# Patient Record
Sex: FEMALE | Race: WHITE | NOT HISPANIC OR LATINO
[De-identification: names, ages, dates, MRNs, and addresses within clinical notes are randomized per-mention and may not be internally consistent; named-entity substitution may affect disease eponyms.]

---

## 2018-11-19 ENCOUNTER — RESULT REVIEW (OUTPATIENT)
Age: 28
End: 2018-11-19

## 2022-04-18 ENCOUNTER — APPOINTMENT (OUTPATIENT)
Dept: PULMONOLOGY | Facility: CLINIC | Age: 32
End: 2022-04-18
Payer: MEDICAID

## 2022-04-18 ENCOUNTER — NON-APPOINTMENT (OUTPATIENT)
Age: 32
End: 2022-04-18

## 2022-04-18 VITALS — WEIGHT: 170.8 LBS

## 2022-04-18 VITALS — SYSTOLIC BLOOD PRESSURE: 120 MMHG | DIASTOLIC BLOOD PRESSURE: 78 MMHG

## 2022-04-18 DIAGNOSIS — Z87.898 PERSONAL HISTORY OF OTHER SPECIFIED CONDITIONS: ICD-10-CM

## 2022-04-18 PROBLEM — Z00.00 ENCOUNTER FOR PREVENTIVE HEALTH EXAMINATION: Noted: 2022-04-18

## 2022-04-18 PROCEDURE — 99215 OFFICE O/P EST HI 40 MIN: CPT

## 2022-04-18 RX ORDER — BUDESONIDE AND FORMOTEROL FUMARATE DIHYDRATE 160; 4.5 UG/1; UG/1
160-4.5 AEROSOL RESPIRATORY (INHALATION)
Refills: 0 | Status: DISCONTINUED | COMMUNITY
Start: 2022-04-15 | End: 2022-04-18

## 2022-04-18 RX ORDER — METHYLPREDNISOLONE 4 MG/1
4 TABLET ORAL
Qty: 21 | Refills: 0 | Status: DISCONTINUED | COMMUNITY
Start: 2022-04-13 | End: 2022-04-18

## 2022-04-18 RX ORDER — ALBUTEROL SULFATE 90 UG/1
108 (90 BASE) INHALANT RESPIRATORY (INHALATION)
Qty: 7 | Refills: 0 | Status: DISCONTINUED | COMMUNITY
Start: 2022-04-13 | End: 2022-04-18

## 2022-04-18 RX ORDER — MINOCYCLINE HYDROCHLORIDE 100 MG/1
100 CAPSULE ORAL
Qty: 30 | Refills: 0 | Status: DISCONTINUED | COMMUNITY
Start: 2021-10-14 | End: 2022-04-18

## 2022-04-18 RX ORDER — ISOTRETINOIN 30 MG/1
30 CAPSULE, LIQUID FILLED ORAL
Qty: 30 | Refills: 0 | Status: DISCONTINUED | COMMUNITY
Start: 2022-02-07 | End: 2022-04-18

## 2022-04-18 RX ORDER — FLUCONAZOLE 150 MG/1
150 TABLET ORAL
Qty: 4 | Refills: 0 | Status: DISCONTINUED | COMMUNITY
Start: 2021-10-14 | End: 2022-04-18

## 2022-04-18 RX ORDER — ISOTRETINOIN 40 MG/1
40 CAPSULE, LIQUID FILLED ORAL
Qty: 30 | Refills: 0 | Status: DISCONTINUED | COMMUNITY
Start: 2022-03-04 | End: 2022-04-18

## 2022-04-18 NOTE — REVIEW OF SYSTEMS
[Fatigue] : fatigue [Recent Wt Gain (___ Lbs)] : ~T recent [unfilled] lb weight gain [Poor Appetite] : poor appetite [Epistaxis] : epistaxis [Dyspnea] : dyspnea [SOB on Exertion] : sob on exertion [GERD] : gerd [Anxiety] : anxiety [Obesity] : obesity [Fever] : no fever [Chills] : no chills [Cough] : no cough [Hemoptysis] : no hemoptysis [Sputum] : no sputum [Wheezing] : no wheezing [Abdominal Pain] : no abdominal pain [Nausea] : no nausea [Vomiting] : no vomiting [Constipation] : no constipation [Dysphagia] : no dysphagia [Bleeding] : no bleeding [Myalgias] : no myalgias [Back Pain] : no back pain [Chronic Pain] : no chronic pain [Rash] : no rash [Blood Transfusion] : no blood transfusion [Clotting Disorder/ Frequent bleeding] : no clotting disorder/ frequent bleeding [Depression] : no depression [Panic Attacks] : no panic attacks [Diabetes] : no diabetes [Thyroid Problem] : no thyroid problem [TextBox_69] : improved with nexium

## 2022-04-18 NOTE — PHYSICAL EXAM
[Normal Oropharynx] : normal oropharynx [II] : Mallampati Class: II [Supple] : supple [No Neck Mass] : no neck mass [No JVD] : no jvd [Clear to Auscultation Bilaterally] : clear to auscultation bilaterally [No Masses] : no masses [Soft] : soft [No Hernias] : no hernias [Normal Bowel Sounds] : normal bowel sounds [Normal Gait] : normal gait [No Clubbing] : no clubbing [No Edema] : no edema [No Rash] : no rash [No Focal Deficits] : no focal deficits [No Motor Deficits] : no motor deficits [TextBox_2] : pleasant f no distress speaking full sentences    no cough noted  [TextBox_54] : s1 s2 tachycardia

## 2022-04-19 ENCOUNTER — RX CHANGE (OUTPATIENT)
Age: 32
End: 2022-04-19

## 2022-04-19 RX ORDER — FLUTICASONE PROPIONATE 220 UG/1
220 AEROSOL, METERED RESPIRATORY (INHALATION)
Qty: 1 | Refills: 3 | Status: DISCONTINUED | COMMUNITY
Start: 2022-04-18 | End: 2022-04-19

## 2022-06-13 ENCOUNTER — APPOINTMENT (OUTPATIENT)
Dept: PULMONOLOGY | Facility: CLINIC | Age: 32
End: 2022-06-13

## 2022-06-13 ENCOUNTER — APPOINTMENT (OUTPATIENT)
Dept: PULMONOLOGY | Facility: CLINIC | Age: 32
End: 2022-06-13
Payer: MEDICAID

## 2022-06-13 VITALS
HEIGHT: 64.17 IN | DIASTOLIC BLOOD PRESSURE: 78 MMHG | BODY MASS INDEX: 30.22 KG/M2 | HEART RATE: 88 BPM | OXYGEN SATURATION: 99 % | SYSTOLIC BLOOD PRESSURE: 118 MMHG | TEMPERATURE: 98.2 F | WEIGHT: 177 LBS

## 2022-06-13 PROCEDURE — 95070 INHLJ BRNCL CHALLENGE TSTG: CPT

## 2022-06-13 PROCEDURE — 94070 EVALUATION OF WHEEZING: CPT

## 2022-06-14 ENCOUNTER — APPOINTMENT (OUTPATIENT)
Dept: PULMONOLOGY | Facility: CLINIC | Age: 32
End: 2022-06-14
Payer: MEDICAID

## 2022-06-14 VITALS — HEIGHT: 64.5 IN | WEIGHT: 175 LBS | BODY MASS INDEX: 29.51 KG/M2

## 2022-06-14 VITALS
DIASTOLIC BLOOD PRESSURE: 70 MMHG | OXYGEN SATURATION: 99 % | RESPIRATION RATE: 16 BRPM | SYSTOLIC BLOOD PRESSURE: 114 MMHG | HEART RATE: 94 BPM

## 2022-06-14 PROCEDURE — 94729 DIFFUSING CAPACITY: CPT

## 2022-06-14 PROCEDURE — 94727 GAS DIL/WSHOT DETER LNG VOL: CPT

## 2022-06-14 PROCEDURE — 99213 OFFICE O/P EST LOW 20 MIN: CPT | Mod: 25

## 2022-06-14 PROCEDURE — 94010 BREATHING CAPACITY TEST: CPT

## 2022-06-14 PROCEDURE — 85018 HEMOGLOBIN: CPT | Mod: QW

## 2022-06-14 RX ORDER — ESOMEPRAZOLE MAGNESIUM 40 MG/1
CAPSULE, DELAYED RELEASE ORAL
Refills: 0 | Status: DISCONTINUED | COMMUNITY
End: 2022-06-14

## 2022-06-14 RX ORDER — ALBUTEROL SULFATE 90 UG/1
108 (90 BASE) INHALANT RESPIRATORY (INHALATION)
Qty: 1 | Refills: 5 | Status: DISCONTINUED | COMMUNITY
Start: 2022-04-19 | End: 2022-06-14

## 2022-06-14 RX ORDER — MONTELUKAST 10 MG/1
10 TABLET, FILM COATED ORAL
Qty: 30 | Refills: 0 | Status: DISCONTINUED | COMMUNITY
Start: 2022-04-13 | End: 2022-06-14

## 2022-06-14 NOTE — ASSESSMENT
[FreeTextEntry1] : 31y/o female with \par \par 1-   shortness of breath    ( last episode  10 years ago)\par            ddx   tachycardia  vs  ozempic  vs       laryngospasm  vs panic attacks\par 2-  h/o   tachycardia  last work up  cardiology  2018\par 3-  weight loss with   ozempic\par 4-  gerd\par 5-  has two cats \par 6- anxiety   now on xanax \par \par \par Recommendations\par 1-   cardiopulmonary stress test ( tachycardia response to exercise)\par 2-  cardiology follow up \par 3-  trial to stop   ozempic\par 4-  PPI  ENT upper airway inspection \par 5-   d/c flovent 220 bid  d/c   xopenex  two inh  q 4 hour prn  sob \par \par patient agrees to above \par return in  four weeks

## 2022-06-14 NOTE — PHYSICAL EXAM
[No Acute Distress] : no acute distress [III] : Mallampati Class: III [Normal Appearance] : normal appearance [Supple] : supple [No Neck Mass] : no neck mass [No JVD] : no jvd [Normal S1, S2] : normal s1, s2 [Clear to Auscultation Bilaterally] : clear to auscultation bilaterally [No Masses] : no masses [Soft] : soft [No Hernias] : no hernias [Normal Bowel Sounds] : normal bowel sounds [Normal Gait] : normal gait [No Clubbing] : no clubbing [No Edema] : no edema [No Rash] : no rash [No Focal Deficits] : no focal deficits [Normal Affect] : normal affect [TextBox_2] : pleasant f no distress speaking full sentences  no cough  [TextBox_11] : nc/at moist

## 2022-06-17 ENCOUNTER — APPOINTMENT (OUTPATIENT)
Dept: OTOLARYNGOLOGY | Facility: CLINIC | Age: 32
End: 2022-06-17
Payer: MEDICAID

## 2022-06-17 VITALS
SYSTOLIC BLOOD PRESSURE: 123 MMHG | WEIGHT: 175 LBS | HEART RATE: 83 BPM | DIASTOLIC BLOOD PRESSURE: 84 MMHG | BODY MASS INDEX: 29.51 KG/M2 | HEIGHT: 64.5 IN

## 2022-06-17 DIAGNOSIS — K14.8 OTHER DISEASES OF TONGUE: ICD-10-CM

## 2022-06-17 DIAGNOSIS — H93.13 TINNITUS, BILATERAL: ICD-10-CM

## 2022-06-17 DIAGNOSIS — Z80.9 FAMILY HISTORY OF MALIGNANT NEOPLASM, UNSPECIFIED: ICD-10-CM

## 2022-06-17 DIAGNOSIS — Z83.3 FAMILY HISTORY OF DIABETES MELLITUS: ICD-10-CM

## 2022-06-17 DIAGNOSIS — Z84.89 FAMILY HISTORY OF OTHER SPECIFIED CONDITIONS: ICD-10-CM

## 2022-06-17 DIAGNOSIS — G43.909 MIGRAINE, UNSPECIFIED, NOT INTRACTABLE, W/OUT STATUS MIGRAINOSUS: ICD-10-CM

## 2022-06-17 DIAGNOSIS — Z82.2 FAMILY HISTORY OF DEAFNESS AND HEARING LOSS: ICD-10-CM

## 2022-06-17 DIAGNOSIS — Z78.9 OTHER SPECIFIED HEALTH STATUS: ICD-10-CM

## 2022-06-17 PROCEDURE — 99203 OFFICE O/P NEW LOW 30 MIN: CPT | Mod: 25

## 2022-06-17 PROCEDURE — 31575 DIAGNOSTIC LARYNGOSCOPY: CPT

## 2022-06-17 PROCEDURE — 92557 COMPREHENSIVE HEARING TEST: CPT

## 2022-06-17 PROCEDURE — 92550 TYMPANOMETRY & REFLEX THRESH: CPT

## 2022-06-17 NOTE — HISTORY OF PRESENT ILLNESS
[de-identified] : Britta Garcia is a 31 yo female referred by Dr. Cuellar for evaluation of shortness of breath. Britta notes that the same symptoms occurred ten years ago. She had increased shortness of breath with no inciting factor and inability to breathe. She denies preceding trauma or URI symptoms. She went to the ED and was told that she may have asthma. She was treated for asthma but had no improvement in symptoms. The shortness of breath resolved after a few months. She notes that in Apr of this year, she woke up one morning and had the same sensation. She notes that she could not inhale well but notes she can exhale well. She notes that she measured her O2 saturatoin and this was normal. She was then seen by  PCP and pulmonologist. She was given oral steroids and singulair. She had no improvement. Currently, she feels throat tightness and mild shortness of breath, although less severe than before. She denies noisy breathing. She notes intermittent hoarseness, but denies dysphagia, odynophagia. She has had issues with GERD before, but not currently. She denies current heartburn. She denies aspiration episodes. She denies recent fevers, chills, unintentional weight loss. She notes a small lymph node in the left posterior neck. She notes chronic postnasal drainage. She denies rhinorrhea, nasal congestion, sinus pressure. She denies recurrent sinus infections. She notes that she had allergy testing previously which was negative, but feels she may have an antihistamine around the time of her test. She denies sneezing, pruritus, epiphora.\par \par She notes that she is near constant bilateral nonpulsatile tinnitus. She feels this has been present for 10 years. She denies hearing change, otalgia, otorrhea, vertigo. She denies facial weakness or facial numbness. She denies recurrent ear infections.\par \par She notes a dorsal tongue discolored spot that has been present her entire life. She denies pain related to it or any change in it.\par \par Previous CXR was normal. PFT revealed ?upper airway flattening. She has undergone methacholine challenge and this was negative. She notes that she has a cardiac stress test scheduled.

## 2022-06-17 NOTE — CONSULT LETTER
[Dear  ___] : Dear  [unfilled], [Consult Letter:] : I had the pleasure of evaluating your patient, [unfilled]. [Please see my note below.] : Please see my note below. [Consult Closing:] : Thank you very much for allowing me to participate in the care of this patient.  If you have any questions, please do not hesitate to contact me. [Sincerely,] : Sincerely, [FreeTextEntry3] : Issac York M.D.

## 2022-06-17 NOTE — ASSESSMENT
[FreeTextEntry1] : Britta Camejo presents for evaluation of shortness of breath. She has undergone extensive workup which has been negative thus far. She denies any stridor. Flexible laryngoscopy was normal and a good but limited view of the subglottis was obtained, showing no evidence of subglottic stenosis. Discussed that this is a limited view and to evaluate the subglottis further, we could obtain CT neck. However, her symptoms and lack of stridor do not correlate with subglottic stenosis. Suspect that this is a lower airway versus cardiac issue and she will continue t to follow with her pulmonologist. \par \par In addition, she notes bilateral tinnitus. Audiogram was normal. Advised use of masking noise.\par \par Finally, she has a small discolored region of the dorsal tip of her tongue. She notes this has been present her whole life. This is likely a small vascular malformation. Advised her to keep an eye on it, and call me if it changes in size or causes pain.\par \par Follow up as needed.

## 2022-06-17 NOTE — DATA REVIEWED
[de-identified] : Tymps: Type A AU\par Audio: -8000 Hz AU\par Recs: 1. ENT f/u, 2. Re-eval as per MD

## 2022-06-17 NOTE — PHYSICAL EXAM
[Midline] : trachea located in midline position [Laryngoscopy Performed] : laryngoscopy was performed, see procedure section for findings [Normal] : no rashes [de-identified] : Small discolored region of right tip of tongue, soft, no palpable mass. Appears to be a vascular malformation.

## 2022-06-17 NOTE — REVIEW OF SYSTEMS
[Seasonal Allergies] : seasonal allergies [Post Nasal Drip] : post nasal drip [Ear Pain] : ear pain [Ear Itch] : ear itch [Throat Clearing] : throat clearing [Eyes Itch] : itching of the eyes [Wheezing] : wheezing [Anxiety] : anxiety [Negative] : Heme/Lymph

## 2022-06-30 ENCOUNTER — APPOINTMENT (OUTPATIENT)
Dept: PULMONOLOGY | Facility: CLINIC | Age: 32
End: 2022-06-30

## 2022-06-30 DIAGNOSIS — Z00.00 ENCOUNTER FOR GENERAL ADULT MEDICAL EXAMINATION W/OUT ABNORMAL FINDINGS: ICD-10-CM

## 2022-06-30 PROCEDURE — 94375 RESPIRATORY FLOW VOLUME LOOP: CPT

## 2022-06-30 PROCEDURE — 94621 CARDIOPULM EXERCISE TESTING: CPT

## 2022-07-08 ENCOUNTER — RX CHANGE (OUTPATIENT)
Age: 32
End: 2022-07-08

## 2022-07-08 ENCOUNTER — APPOINTMENT (OUTPATIENT)
Dept: PULMONOLOGY | Facility: CLINIC | Age: 32
End: 2022-07-08

## 2022-07-08 VITALS
SYSTOLIC BLOOD PRESSURE: 124 MMHG | DIASTOLIC BLOOD PRESSURE: 78 MMHG | OXYGEN SATURATION: 99 % | HEART RATE: 103 BPM | BODY MASS INDEX: 29.51 KG/M2 | WEIGHT: 175 LBS | HEIGHT: 64.5 IN | RESPIRATION RATE: 16 BRPM

## 2022-07-08 DIAGNOSIS — J45.909 UNSPECIFIED ASTHMA, UNCOMPLICATED: ICD-10-CM

## 2022-07-08 PROCEDURE — 99215 OFFICE O/P EST HI 40 MIN: CPT

## 2022-07-08 RX ORDER — LEVALBUTEROL TARTRATE 45 UG/1
45 AEROSOL, METERED ORAL
Qty: 1 | Refills: 3 | Status: DISCONTINUED | COMMUNITY
Start: 2022-04-18 | End: 2022-07-08

## 2022-07-08 RX ORDER — SEMAGLUTIDE 1.34 MG/ML
2 INJECTION, SOLUTION SUBCUTANEOUS
Qty: 2 | Refills: 0 | Status: DISCONTINUED | COMMUNITY
Start: 2022-03-31 | End: 2022-07-08

## 2022-07-08 NOTE — PHYSICAL EXAM
[III] : Mallampati Class: III [Supple] : supple [No Neck Mass] : no neck mass [No JVD] : no jvd [Clear to Auscultation Bilaterally] : clear to auscultation bilaterally [Not Tender] : not tender [No HSM] : no hsm [No Hernias] : no hernias [Normal Gait] : normal gait [No Clubbing] : no clubbing [No Edema] : no edema [No Rash] : no rash [No Motor Deficits] : no motor deficits [Normal Affect] : normal affect [TextBox_2] : well built female n odistress speaking full sentences no cough   [TextBox_54] : tachycardia noted no murmur

## 2022-07-12 ENCOUNTER — RESULT CHARGE (OUTPATIENT)
Age: 32
End: 2022-07-12

## 2022-07-21 ENCOUNTER — APPOINTMENT (OUTPATIENT)
Dept: CARDIOLOGY | Facility: CLINIC | Age: 32
End: 2022-07-21

## 2022-07-21 ENCOUNTER — NON-APPOINTMENT (OUTPATIENT)
Age: 32
End: 2022-07-21

## 2022-07-21 VITALS
HEART RATE: 97 BPM | DIASTOLIC BLOOD PRESSURE: 70 MMHG | SYSTOLIC BLOOD PRESSURE: 114 MMHG | TEMPERATURE: 98.1 F | OXYGEN SATURATION: 95 % | WEIGHT: 174 LBS | HEIGHT: 65 IN | BODY MASS INDEX: 28.99 KG/M2

## 2022-07-21 DIAGNOSIS — Z87.09 PERSONAL HISTORY OF OTHER DISEASES OF THE RESPIRATORY SYSTEM: ICD-10-CM

## 2022-07-21 DIAGNOSIS — R51.9 HEADACHE, UNSPECIFIED: ICD-10-CM

## 2022-07-21 DIAGNOSIS — R07.9 CHEST PAIN, UNSPECIFIED: ICD-10-CM

## 2022-07-21 DIAGNOSIS — R94.31 ABNORMAL ELECTROCARDIOGRAM [ECG] [EKG]: ICD-10-CM

## 2022-07-21 PROCEDURE — 93000 ELECTROCARDIOGRAM COMPLETE: CPT

## 2022-07-21 PROCEDURE — 99204 OFFICE O/P NEW MOD 45 MIN: CPT | Mod: 25

## 2022-07-21 RX ORDER — CETIRIZINE HYDROCHLORIDE 10 MG/1
10 CAPSULE, LIQUID FILLED ORAL
Refills: 0 | Status: DISCONTINUED | COMMUNITY
End: 2022-07-21

## 2022-07-21 RX ORDER — ERGOCALCIFEROL 1.25 MG/1
1.25 MG CAPSULE, LIQUID FILLED ORAL
Qty: 4 | Refills: 0 | Status: DISCONTINUED | COMMUNITY
Start: 2021-12-02 | End: 2022-07-21

## 2022-07-21 RX ORDER — CALCIUM CARBONATE 500 MG/1
TABLET, CHEWABLE ORAL
Refills: 0 | Status: DISCONTINUED | COMMUNITY
End: 2022-07-21

## 2022-07-21 RX ORDER — CLONAZEPAM 0.5 MG/1
0.5 TABLET ORAL
Qty: 90 | Refills: 0 | Status: DISCONTINUED | COMMUNITY
Start: 2021-10-17 | End: 2022-07-21

## 2022-07-21 NOTE — HISTORY OF PRESENT ILLNESS
[FreeTextEntry1] : 32F h/o anxiety and asthma following with pulmonary for dyspnea, CXR and CT chest negative, off Ozempic, sinus tachycardia had prior outside cardiology evaluation 2018, recent outside EKG with right axis and incomplete RBBB, outside Echo with LVEF 74% (GLS -19%), normal RV size/function, small pericardial effusion, refer to establish care with new cardiologist. \par \par \par Patient presents with her mother for the visit. \par Reports around age 21 had episode of shortness of breath seen by pulmonary then, told with inappropriate sinus tachycardia previously seen cardiologist ([facility]) intolerant to metoprolol switched to Cardizem but then with low blood pressure since discontinued meds. Since April been having recurrent shortness of breath required steroids use, reports had cardiopulmonary exercise 6/2022 with 12 minutes of exercise with peak HR of 173. Restarted Ozempic with recurrence of shortness of breath and chest discomfort last week, self restarted prednisone 40mg and attempts to titrate off then restarted having chest pressure and pain. Planning on having nonestrogen IUD placement. \par \par \par Uncle CAD/stent- smoker\par Nonsmoker, no alcohol\par COVID vaccine booster x1\par Studying in nursing school

## 2022-07-21 NOTE — DISCUSSION/SUMMARY
[FreeTextEntry1] : 32F h/o anxiety and asthma following with pulmonary for dyspnea, CXR and CT chest negative, off Ozempic, sinus tachycardia had prior outside cardiology evaluation 2018, recent outside EKG with right axis and incomplete RBBB, outside Echo with LVEF 74% (GLS -19%), normal RV size/function, small pericardial effusion, refer to establish care with new cardiologist. \par \par Recurrent episodes of exertional dyspnea and chest discomfort recently after association with Ozempic use, EKG unchanged from baseline no ischemic abnormality, reviewed cardiopulmoanry exercise with appropriate HR increase and reports of chest pain albeit there was no EKG monitoring. Coincidental improvement of symptoms with repeated steroids use, no EKG features to suggest pericarditis, however chest pain is positional at times, advised to wean off prednisone use if recurrence of chest pain/dyspnea to obtain ESR and CRP with repeat Echo in 2 weeks, may consider cardiac MRI if those studies are abnormal. We have discussed need to avoid long term steroids use with complications. \par \par \par 1. Intermittent chest pain, shortness of breath- monitor for symptoms resolution off prednisone if recur then obtain ESR/CRP with repeat Echo. Continue inhalers and pulmonary follow up. \par \par 2. Small pericardial effusion- not reported on CT but on Echo done a week after; repeat TTE if recurrent chest pain/dyspnea. \par \par \par Follow up in 2 months.  [EKG obtained to assist in diagnosis and management of assessed problem(s)] : EKG obtained to assist in diagnosis and management of assessed problem(s)

## 2022-07-21 NOTE — CARDIOLOGY SUMMARY
[de-identified] : 7/21/22- Sinus 92, right axis, iRBBB, QTc 402 [de-identified] : 7/13/22-  outside Echo with LVEF 74% (GLS -19%), normal RV size/function, small pericardial effusion

## 2022-07-21 NOTE — PHYSICAL EXAM

## 2022-08-02 ENCOUNTER — RX CHANGE (OUTPATIENT)
Age: 32
End: 2022-08-02

## 2022-08-02 ENCOUNTER — APPOINTMENT (OUTPATIENT)
Dept: PULMONOLOGY | Facility: CLINIC | Age: 32
End: 2022-08-02

## 2022-08-02 VITALS
OXYGEN SATURATION: 99 % | HEIGHT: 65 IN | BODY MASS INDEX: 28.99 KG/M2 | RESPIRATION RATE: 16 BRPM | WEIGHT: 174 LBS | HEART RATE: 107 BPM | SYSTOLIC BLOOD PRESSURE: 116 MMHG | DIASTOLIC BLOOD PRESSURE: 72 MMHG

## 2022-08-02 DIAGNOSIS — R13.10 DYSPHAGIA, UNSPECIFIED: ICD-10-CM

## 2022-08-02 PROCEDURE — 99214 OFFICE O/P EST MOD 30 MIN: CPT

## 2022-08-02 RX ORDER — FLUTICASONE FUROATE AND VILANTEROL TRIFENATATE 100; 25 UG/1; UG/1
100-25 POWDER RESPIRATORY (INHALATION) DAILY
Qty: 1 | Refills: 3 | Status: DISCONTINUED | COMMUNITY
Start: 2022-08-02 | End: 2022-08-02

## 2022-08-02 NOTE — HISTORY OF PRESENT ILLNESS
[Never] : never [TextBox_4] : 32F PMH asthma, GERD who presents for f/u asthma. Was given a course of Prednisone last visit - was on it for 2.5 weeks. She is taking Arnuity ellipta and Singulair. She takes albuterol which does not help. The second shot of Ozempic 7/3/22 and the next day had worsening SOB. She reports multiple negative allergy tests. Had negative D-dimer. No F/C, no chest pain, no palpitations, no N/V/D. Had ENT visit (Dr. York, [place]), everything looked okay.

## 2022-08-08 ENCOUNTER — APPOINTMENT (OUTPATIENT)
Dept: CARDIOLOGY | Facility: CLINIC | Age: 32
End: 2022-08-08

## 2022-09-14 ENCOUNTER — APPOINTMENT (OUTPATIENT)
Dept: PULMONOLOGY | Facility: CLINIC | Age: 32
End: 2022-09-14

## 2022-09-14 VITALS
WEIGHT: 172 LBS | SYSTOLIC BLOOD PRESSURE: 110 MMHG | HEIGHT: 65 IN | BODY MASS INDEX: 28.66 KG/M2 | HEART RATE: 72 BPM | RESPIRATION RATE: 16 BRPM | DIASTOLIC BLOOD PRESSURE: 76 MMHG | OXYGEN SATURATION: 99 %

## 2022-09-14 PROCEDURE — 99214 OFFICE O/P EST MOD 30 MIN: CPT

## 2022-09-14 RX ORDER — MONTELUKAST 10 MG/1
10 TABLET, FILM COATED ORAL
Refills: 0 | Status: DISCONTINUED | COMMUNITY
End: 2022-09-14

## 2022-09-14 RX ORDER — BUSPIRONE HYDROCHLORIDE 5 MG/1
5 TABLET ORAL
Qty: 90 | Refills: 0 | Status: DISCONTINUED | COMMUNITY
Start: 2022-06-25 | End: 2022-09-14

## 2022-09-14 RX ORDER — PREDNISONE 10 MG/1
10 TABLET ORAL
Qty: 30 | Refills: 1 | Status: DISCONTINUED | COMMUNITY
Start: 2022-08-10 | End: 2022-09-14

## 2022-09-14 RX ORDER — PREDNISONE 20 MG/1
20 TABLET ORAL DAILY
Qty: 60 | Refills: 0 | Status: DISCONTINUED | COMMUNITY
Start: 2022-07-08 | End: 2022-09-14

## 2022-09-14 RX ORDER — ONDANSETRON 4 MG/1
4 TABLET ORAL
Qty: 12 | Refills: 0 | Status: DISCONTINUED | COMMUNITY
Start: 2022-06-27

## 2022-09-14 RX ORDER — FLUTICASONE FUROATE 200 UG/1
200 POWDER RESPIRATORY (INHALATION) DAILY
Qty: 3 | Refills: 3 | Status: DISCONTINUED | COMMUNITY
Start: 2022-04-19 | End: 2022-09-14

## 2022-09-14 NOTE — HISTORY OF PRESENT ILLNESS
[Never] : never [TextBox_4] : 32F PMH asthma, GERD who presents for f/u asthma. [facility] Wixela [facility]. She is on Singulair. Had CPET 06/2022 showing deconditioning. had normal PFT 06/2022 but flattened inspiratory loop. Feels "okay" on Wixela. She is using albuterol only on occasion. She gets SOB after exercising, but notices it the following day. She has never had COVID. She is able to otherwise perform physical activity with moderate exertion. Her allergen panel was completely negative, IgE was 6; however she notes anaphylactoid reaction to certain things such as peanut butter and environmental allergies.

## 2022-10-07 ENCOUNTER — APPOINTMENT (OUTPATIENT)
Dept: CARDIOLOGY | Facility: CLINIC | Age: 32
End: 2022-10-07

## 2022-10-07 ENCOUNTER — NON-APPOINTMENT (OUTPATIENT)
Age: 32
End: 2022-10-07

## 2022-10-07 VITALS
BODY MASS INDEX: 29.16 KG/M2 | HEIGHT: 65 IN | DIASTOLIC BLOOD PRESSURE: 78 MMHG | OXYGEN SATURATION: 99 % | SYSTOLIC BLOOD PRESSURE: 120 MMHG | WEIGHT: 175 LBS | HEART RATE: 87 BPM | TEMPERATURE: 98.2 F

## 2022-10-07 PROCEDURE — 99214 OFFICE O/P EST MOD 30 MIN: CPT | Mod: 25

## 2022-10-07 PROCEDURE — 93000 ELECTROCARDIOGRAM COMPLETE: CPT

## 2022-10-31 ENCOUNTER — NON-APPOINTMENT (OUTPATIENT)
Age: 32
End: 2022-10-31

## 2022-11-17 ENCOUNTER — EMERGENCY (EMERGENCY)
Facility: HOSPITAL | Age: 32
LOS: 1 days | Discharge: HOME | End: 2022-11-17
Attending: EMERGENCY MEDICINE
Payer: COMMERCIAL

## 2022-11-17 ENCOUNTER — APPOINTMENT (OUTPATIENT)
Dept: PULMONOLOGY | Facility: CLINIC | Age: 32
End: 2022-11-17

## 2022-11-17 VITALS
RESPIRATION RATE: 16 BRPM | HEART RATE: 95 BPM | SYSTOLIC BLOOD PRESSURE: 133 MMHG | DIASTOLIC BLOOD PRESSURE: 70 MMHG | OXYGEN SATURATION: 98 % | TEMPERATURE: 98 F

## 2022-11-17 VITALS
SYSTOLIC BLOOD PRESSURE: 144 MMHG | HEART RATE: 144 BPM | HEIGHT: 65 IN | TEMPERATURE: 98 F | RESPIRATION RATE: 20 BRPM | DIASTOLIC BLOOD PRESSURE: 85 MMHG | OXYGEN SATURATION: 96 % | WEIGHT: 74.08 LBS

## 2022-11-17 VITALS
RESPIRATION RATE: 16 BRPM | BODY MASS INDEX: 28.62 KG/M2 | WEIGHT: 172 LBS | OXYGEN SATURATION: 98 % | HEART RATE: 136 BPM | DIASTOLIC BLOOD PRESSURE: 80 MMHG | SYSTOLIC BLOOD PRESSURE: 136 MMHG

## 2022-11-17 LAB
ALBUMIN SERPL ELPH-MCNC: 4.8 G/DL — SIGNIFICANT CHANGE UP (ref 3.3–5.2)
ALP SERPL-CCNC: 68 U/L — SIGNIFICANT CHANGE UP (ref 40–120)
ALT FLD-CCNC: 36 U/L — HIGH
ANION GAP SERPL CALC-SCNC: 17 MMOL/L — SIGNIFICANT CHANGE UP (ref 5–17)
AST SERPL-CCNC: 29 U/L — SIGNIFICANT CHANGE UP
BASOPHILS # BLD AUTO: 0.02 K/UL — SIGNIFICANT CHANGE UP (ref 0–0.2)
BASOPHILS NFR BLD AUTO: 0.3 % — SIGNIFICANT CHANGE UP (ref 0–2)
BILIRUB SERPL-MCNC: 0.2 MG/DL — LOW (ref 0.4–2)
BUN SERPL-MCNC: 11.7 MG/DL — SIGNIFICANT CHANGE UP (ref 8–20)
CALCIUM SERPL-MCNC: 9.5 MG/DL — SIGNIFICANT CHANGE UP (ref 8.4–10.5)
CHLORIDE SERPL-SCNC: 104 MMOL/L — SIGNIFICANT CHANGE UP (ref 96–108)
CO2 SERPL-SCNC: 19 MMOL/L — LOW (ref 22–29)
CREAT SERPL-MCNC: 0.94 MG/DL — SIGNIFICANT CHANGE UP (ref 0.5–1.3)
EGFR: 84 ML/MIN/1.73M2 — SIGNIFICANT CHANGE UP
EOSINOPHIL # BLD AUTO: 0 K/UL — SIGNIFICANT CHANGE UP (ref 0–0.5)
EOSINOPHIL NFR BLD AUTO: 0 % — SIGNIFICANT CHANGE UP (ref 0–6)
GLUCOSE SERPL-MCNC: 164 MG/DL — HIGH (ref 70–99)
HCG SERPL-ACNC: <4 MIU/ML — SIGNIFICANT CHANGE UP
HCT VFR BLD CALC: 42.1 % — SIGNIFICANT CHANGE UP (ref 34.5–45)
HGB BLD-MCNC: 14.6 G/DL — SIGNIFICANT CHANGE UP (ref 11.5–15.5)
IMM GRANULOCYTES NFR BLD AUTO: 0.1 % — SIGNIFICANT CHANGE UP (ref 0–0.9)
LYMPHOCYTES # BLD AUTO: 0.78 K/UL — LOW (ref 1–3.3)
LYMPHOCYTES # BLD AUTO: 10.9 % — LOW (ref 13–44)
MCHC RBC-ENTMCNC: 28.8 PG — SIGNIFICANT CHANGE UP (ref 27–34)
MCHC RBC-ENTMCNC: 34.7 GM/DL — SIGNIFICANT CHANGE UP (ref 32–36)
MCV RBC AUTO: 83 FL — SIGNIFICANT CHANGE UP (ref 80–100)
MONOCYTES # BLD AUTO: 0.1 K/UL — SIGNIFICANT CHANGE UP (ref 0–0.9)
MONOCYTES NFR BLD AUTO: 1.4 % — LOW (ref 2–14)
NEUTROPHILS # BLD AUTO: 6.22 K/UL — SIGNIFICANT CHANGE UP (ref 1.8–7.4)
NEUTROPHILS NFR BLD AUTO: 87.3 % — HIGH (ref 43–77)
NT-PROBNP SERPL-SCNC: 9 PG/ML — SIGNIFICANT CHANGE UP (ref 0–300)
PLATELET # BLD AUTO: 227 K/UL — SIGNIFICANT CHANGE UP (ref 150–400)
POTASSIUM SERPL-MCNC: 3.9 MMOL/L — SIGNIFICANT CHANGE UP (ref 3.5–5.3)
POTASSIUM SERPL-SCNC: 3.9 MMOL/L — SIGNIFICANT CHANGE UP (ref 3.5–5.3)
PROT SERPL-MCNC: 7.8 G/DL — SIGNIFICANT CHANGE UP (ref 6.6–8.7)
RBC # BLD: 5.07 M/UL — SIGNIFICANT CHANGE UP (ref 3.8–5.2)
RBC # FLD: 12.6 % — SIGNIFICANT CHANGE UP (ref 10.3–14.5)
SODIUM SERPL-SCNC: 139 MMOL/L — SIGNIFICANT CHANGE UP (ref 135–145)
TROPONIN T SERPL-MCNC: <0.01 NG/ML — SIGNIFICANT CHANGE UP (ref 0–0.06)
WBC # BLD: 7.13 K/UL — SIGNIFICANT CHANGE UP (ref 3.8–10.5)
WBC # FLD AUTO: 7.13 K/UL — SIGNIFICANT CHANGE UP (ref 3.8–10.5)

## 2022-11-17 PROCEDURE — 71275 CT ANGIOGRAPHY CHEST: CPT | Mod: MA

## 2022-11-17 PROCEDURE — 84702 CHORIONIC GONADOTROPIN TEST: CPT

## 2022-11-17 PROCEDURE — 93306 TTE W/DOPPLER COMPLETE: CPT | Mod: 26

## 2022-11-17 PROCEDURE — 99285 EMERGENCY DEPT VISIT HI MDM: CPT

## 2022-11-17 PROCEDURE — 83880 ASSAY OF NATRIURETIC PEPTIDE: CPT

## 2022-11-17 PROCEDURE — 71045 X-RAY EXAM CHEST 1 VIEW: CPT

## 2022-11-17 PROCEDURE — 71045 X-RAY EXAM CHEST 1 VIEW: CPT | Mod: 26

## 2022-11-17 PROCEDURE — 36415 COLL VENOUS BLD VENIPUNCTURE: CPT

## 2022-11-17 PROCEDURE — 70491 CT SOFT TISSUE NECK W/DYE: CPT | Mod: MA

## 2022-11-17 PROCEDURE — 70491 CT SOFT TISSUE NECK W/DYE: CPT | Mod: 26,MA

## 2022-11-17 PROCEDURE — 99214 OFFICE O/P EST MOD 30 MIN: CPT

## 2022-11-17 PROCEDURE — 93010 ELECTROCARDIOGRAM REPORT: CPT

## 2022-11-17 PROCEDURE — 80053 COMPREHEN METABOLIC PANEL: CPT

## 2022-11-17 PROCEDURE — 84484 ASSAY OF TROPONIN QUANT: CPT

## 2022-11-17 PROCEDURE — 99285 EMERGENCY DEPT VISIT HI MDM: CPT | Mod: 25

## 2022-11-17 PROCEDURE — 71275 CT ANGIOGRAPHY CHEST: CPT | Mod: 26,MA

## 2022-11-17 PROCEDURE — 93005 ELECTROCARDIOGRAM TRACING: CPT

## 2022-11-17 PROCEDURE — 85025 COMPLETE CBC W/AUTO DIFF WBC: CPT

## 2022-11-17 PROCEDURE — C8929: CPT

## 2022-11-17 PROCEDURE — 96360 HYDRATION IV INFUSION INIT: CPT | Mod: XU

## 2022-11-17 RX ORDER — CIPROFLOXACIN HYDROCHLORIDE 500 MG/1
500 TABLET, FILM COATED ORAL
Qty: 20 | Refills: 0 | Status: DISCONTINUED | COMMUNITY
Start: 2022-10-17

## 2022-11-17 RX ORDER — SODIUM CHLORIDE 9 MG/ML
1000 INJECTION INTRAMUSCULAR; INTRAVENOUS; SUBCUTANEOUS ONCE
Refills: 0 | Status: COMPLETED | OUTPATIENT
Start: 2022-11-17 | End: 2022-11-17

## 2022-11-17 RX ORDER — GUAIFENESIN AND CODEINE PHOSPHATE 10; 100 MG/5ML; MG/5ML
100-10 LIQUID ORAL
Qty: 210 | Refills: 0 | Status: DISCONTINUED | COMMUNITY
Start: 2022-10-15

## 2022-11-17 RX ADMIN — SODIUM CHLORIDE 1000 MILLILITER(S): 9 INJECTION INTRAMUSCULAR; INTRAVENOUS; SUBCUTANEOUS at 18:17

## 2022-11-17 NOTE — CONSULT NOTE ADULT - TIME BILLING
coordinating care ED attending, outpatient pulmonologist, reviewing office records and PFTs, counseling patient on plan for CT and reason for it.

## 2022-11-17 NOTE — ED PROVIDER NOTE - OBJECTIVE STATEMENT
31 yo female with PMHx of Anxiety, Migraines, Asthma and Palpitations presents with complaints of intermittent SOB since April after starting Ozempic for weight loss. She was sent to the ED by Pulmonologist today who was concerned about rapid heart rate. Since the onset of SOB she was evaluated by Pulmonologist who started her on Albuterol and Prednisone with minimal improvement in symptoms. Methacholine challenge and PFT was normal. She also reports eval by Cardiologist in July, echo showed small pericardial effusion. Last dose of Albuterol was 2pm and prednisone was yesterday. She denies CP, history of PE or DVT, pain or swelling in lower extremities. No history of smoking or oral contraceptive.

## 2022-11-17 NOTE — HISTORY OF PRESENT ILLNESS
[Never] : never [TextBox_4] : 32F PMH asthma, GERD who presents for f/u asthma. She is currently on Wixela 500-50. She is currently on an antibiotic for a UTI. On Friday, 11/11/22, she felt a "constricting" feeling in her chest. She has been feeling nausea. She notes body aches and cramps in feet and legs. She started herself on Prednisone 40mg starting 2 days ago. Despite being on steroids she is feeling SOB. She notes her HR has been around 100 at home. No fevers, no chills. She denies joint pains, change in voice, hand pain.

## 2022-11-17 NOTE — ED ADULT NURSE NOTE - CAS ELECT INFOMATION PROVIDED
DC instructions given by MD; pt verbalized understanding. Pt remains alert and O x4. NAD noted. Resp E/U. VSS. Pt denies any pain./DC instructions

## 2022-11-17 NOTE — ED PROVIDER NOTE - PATIENT PORTAL LINK FT
You can access the FollowMyHealth Patient Portal offered by [facility] by registering at the following website: [email or web address]. By joining [facility]’s FollowMyHealth portal, you will also be able to view your health information using other applications (apps) compatible with our system.

## 2022-11-17 NOTE — ED ADULT TRIAGE NOTE - CHIEF COMPLAINT QUOTE
pt snt in by Pulmonologist MD Batres for eval. pt has been having sob and palpitations since trying a new weight loss medication in April.

## 2022-11-17 NOTE — ED PROVIDER NOTE - ATTENDING CONTRIBUTION TO CARE
pleasant young female with SOB and palpitations on and off for several months; lengthy outpatient workup; now with worsening symptoms for several days;' seen by pulm as outpt today, noted to be very tachy and referred to ED; on exam, tachy, but otherwise well appearing, NAD, no JVD; noraml heart and lung sounds; warm and well perfused, equal pulses; abd soft nt nd; no edema; labs and imaging noted, pulm consult appreciated, ok for d/c with continued outpt f/u

## 2022-11-17 NOTE — CONSULT NOTE ADULT - ASSESSMENT
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    Currently I have no clear explanation for her dyspnea and her tachycardia, she is not having an acute asthma exacerbation as her lungs are clear.  She is on room air.    Recommendations:  Would start with a CTA chest to rule out PE and underlying parenchymal pulmonary disease.  Would also extend CT to neck given hx of flattened inspiratory loop on recent spirometry (sometimes indicates variable extrathoracic airway obstruction).    EKG should be performed  At some point a repeat echo will be helpful as well.    Case discussed with ED attending Dr Escobar and patient's primary pulmonologist Dr Batres.

## 2022-11-17 NOTE — CONSULT NOTE ADULT - SUBJECTIVE AND OBJECTIVE BOX
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  Patient reports starting Ozempic for weight loss several months ago and noted after her second dose she began having dyspnea and wheezing.  She stopped the Ozempic but would get occasional episodes of dyspnea and wheeze, often requiring prolonged tapers of prednisone.  She underwent an outpatient echocardiogram which revealed normal EF, small pericardial effusion.  She also underwent CPET which indicated deconditioning.    Her most recent episode of dyspnea began 6 days ago, for which she was taking albuterol with minimal improvement.  2 days ago she noted severe dyspnea on exertion and began using her prednisone which she keeps with her (starting at 40 mg).  Today she saw Dr Batres in the pulmonary clinic and was sent to the ED due to sinus tachycardia to 140s.  She did not have any wheezing in the office.    EXAM:  alert, oriented, cooperative  concerned but NAD  no lymphadenopathy noted  lungs clear bilat, no wheeze  tachycardic, regular, appears sinus on monitor  no edema  not on oxygen    RADIOLOGY: cxr - no large infiltrates or effusions, no pneumothorax, normal size heart    PAST MEDICAL & SURGICAL HISTORY:  Asthma  Pericardial effusion  IUD for birth control     No Known Allergies      SOCIAL HISTORY/FAMILY HISTORY:   Currently a nursing student  No tobacco use    Medications:  Wixela (Advair) 500/50 BID  Previously on Ozempic   Previously on Singulair       12 point ROS performed, pertinent positives and negatives mentioned above, all other ROS negative.     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  Patient reports starting Ozempic for weight loss several months ago in April and noted after her second dose she began having dyspnea and wheezing.  She stopped the Ozempic but would get occasional episodes of dyspnea and wheeze, often requiring prolonged tapers of prednisone.  She underwent an outpatient echocardiogram which revealed normal EF, small pericardial effusion.  She also underwent CPET which indicated deconditioning.  She reports having prior Methacholine challenge which she tells me was negative along with prior CAT scans of her chest.   Her most recent episode of dyspnea began 6 days ago, for which she was taking albuterol with minimal improvement.  2 days ago she noted severe dyspnea on exertion and began using her prednisone which she keeps with her (starting at 40 mg).  Today she saw Dr Batres in the pulmonary clinic and was sent to the ED due to sinus tachycardia to 140s.  She did not have any wheezing in the office.    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     T(F): 98.4 (11-17-22 @ 15:37), Max: 98.4 (11-17-22 @ 15:37)  HR: 144 (11-17-22 @ 15:37)  BP: 144/85 (11-17-22 @ 15:37)  BP(mean): --  ABP: --  RR: 20 (11-17-22 @ 15:37)  SpO2: 96% (11-17-22 @ 15:37)        LABS:      Pending           CAPILLARY BLOOD GLUCOSE            CULTURES:

## 2022-11-17 NOTE — ED ADULT NURSE NOTE - OBJECTIVE STATEMENT
complaints of dyspnea x 6 days along with sinus tachycardia 140s on arrival..  Patient reports starting Ozempic for weight loss several months ago in April and noted after her second dose she began having dyspnea and wheezing.

## 2022-12-01 ENCOUNTER — APPOINTMENT (OUTPATIENT)
Dept: CARDIOLOGY | Facility: CLINIC | Age: 32
End: 2022-12-01

## 2022-12-01 VITALS
TEMPERATURE: 98.8 F | BODY MASS INDEX: 29.66 KG/M2 | HEART RATE: 94 BPM | WEIGHT: 178 LBS | OXYGEN SATURATION: 99 % | SYSTOLIC BLOOD PRESSURE: 126 MMHG | HEIGHT: 65 IN | DIASTOLIC BLOOD PRESSURE: 84 MMHG

## 2022-12-01 DIAGNOSIS — Z09 ENCOUNTER FOR FOLLOW-UP EXAMINATION AFTER COMPLETED TREATMENT FOR CONDITIONS OTHER THAN MALIGNANT NEOPLASM: ICD-10-CM

## 2022-12-01 DIAGNOSIS — I31.39 OTHER PERICARDIAL EFFUSION (NONINFLAMMATORY): ICD-10-CM

## 2022-12-01 PROCEDURE — 99214 OFFICE O/P EST MOD 30 MIN: CPT | Mod: 25

## 2022-12-01 PROCEDURE — 93000 ELECTROCARDIOGRAM COMPLETE: CPT

## 2022-12-01 RX ORDER — SULFAMETHOXAZOLE AND TRIMETHOPRIM 800; 160 MG/1; MG/1
800-160 TABLET ORAL
Qty: 20 | Refills: 0 | Status: DISCONTINUED | COMMUNITY
Start: 2022-11-10 | End: 2022-12-01

## 2022-12-01 RX ORDER — ALPRAZOLAM 0.5 MG/1
0.5 TABLET ORAL
Refills: 0 | Status: DISCONTINUED | COMMUNITY
End: 2022-12-01

## 2023-02-07 ENCOUNTER — APPOINTMENT (OUTPATIENT)
Dept: CARDIOLOGY | Facility: CLINIC | Age: 33
End: 2023-02-07

## 2023-03-17 ENCOUNTER — APPOINTMENT (OUTPATIENT)
Dept: OTOLARYNGOLOGY | Facility: CLINIC | Age: 33
End: 2023-03-17
Payer: MEDICAID

## 2023-03-17 VITALS
SYSTOLIC BLOOD PRESSURE: 148 MMHG | TEMPERATURE: 98 F | DIASTOLIC BLOOD PRESSURE: 96 MMHG | HEIGHT: 65 IN | WEIGHT: 168 LBS | BODY MASS INDEX: 27.99 KG/M2 | HEART RATE: 114 BPM

## 2023-03-17 PROCEDURE — 31575 DIAGNOSTIC LARYNGOSCOPY: CPT

## 2023-03-17 PROCEDURE — 99203 OFFICE O/P NEW LOW 30 MIN: CPT | Mod: 25

## 2023-03-17 NOTE — PROCEDURE
[de-identified] : Flexible scope #2 used. Passed through nasal passage and nasopharynx/oropharynx/hypopharynx clear. Supraglottis normal. Glottis with fully mobile vocal cords without lesions or masses. Visualized subglottis clear. Postcricoid area without erythema or edema. No pooling of secretions.

## 2023-03-17 NOTE — ASSESSMENT
[FreeTextEntry1] : likely vocal cord dysfunction:\par - she has had recurrent episodes of difficulty breathing and feeling resistance with inhalation, not responsive to typical asthma medications, negative pulm and cardiac workups, and this is very consistent with VCD (darius slightly flattened insp loop on PFT)\par - despite normal laryngoscopy today, which is expected since she is not symptomatic, would recommend course of speech therapy by a speech pathologist trained in vocal cord dysfunction\par - the patient has good insight and is amenable to any treatments that will help resolve this; advised that this may have been triggered by a reaction to Ozempic\par - also discussed anxiety component and treating this as a separate way of managing contributing factors\par - if symptoms persist after treatment with speech therapy can refer to laryngology\par

## 2023-03-17 NOTE — CONSULT LETTER
[Dear  ___] : Dear  [unfilled], [Consult Letter:] : I had the pleasure of evaluating your patient, [unfilled]. [Please see my note below.] : Please see my note below. [Consult Closing:] : Thank you very much for allowing me to participate in the care of this patient.  If you have any questions, please do not hesitate to contact me. [Sincerely,] : Sincerely, [FreeTextEntry3] : Hayley Arvizu MD\par Otolaryngology and Cranial Base Surgery\par Attending Physician - Department of Otolaryngology and Head & Neck Surgery\par [facility]\par \par [employer] [facility]

## 2023-03-17 NOTE — HISTORY OF PRESENT ILLNESS
[de-identified] : 33yo with SOB that started Ozempic for weight loss (had had similar sx at 20yo that resolved on its own) and has had negative cardiac and pulm workup. She notes multiple prednisone tapers. She has notes she doesn’t respond to asthma treatments or albuterol. She currently feels very asymptomatic. She feels a little tightness in her throat. Activity, exercise, eating will trigger it. She does not have stridor but notes she feels resistance breathing in when she is symptomatic. She has had us and imaging and no extrinsic compression.

## 2023-04-18 ENCOUNTER — APPOINTMENT (OUTPATIENT)
Dept: PULMONOLOGY | Facility: CLINIC | Age: 33
End: 2023-04-18
Payer: MEDICAID

## 2023-04-18 VITALS
OXYGEN SATURATION: 98 % | HEART RATE: 110 BPM | BODY MASS INDEX: 28.32 KG/M2 | WEIGHT: 170 LBS | SYSTOLIC BLOOD PRESSURE: 136 MMHG | RESPIRATION RATE: 16 BRPM | DIASTOLIC BLOOD PRESSURE: 82 MMHG | HEIGHT: 65 IN

## 2023-04-18 PROCEDURE — 99214 OFFICE O/P EST MOD 30 MIN: CPT

## 2023-04-18 RX ORDER — FLUTICASONE PROPIONATE AND SALMETEROL 500; 50 UG/1; UG/1
500-50 POWDER RESPIRATORY (INHALATION)
Qty: 60 | Refills: 5 | Status: DISCONTINUED | COMMUNITY
Start: 2022-08-02 | End: 2023-04-18

## 2023-04-18 RX ORDER — TRIAMCINOLONE ACETONIDE 1 MG/G
0.1 CREAM TOPICAL
Qty: 80 | Refills: 0 | Status: DISCONTINUED | COMMUNITY
Start: 2022-05-13 | End: 2023-04-18

## 2023-04-18 NOTE — HISTORY OF PRESENT ILLNESS
[Never] : never [TextBox_4] : 33F PMH asthma, likely vocal cord dysfunction, non-smoker, GERD who presents for f/u asthma. W/u has included negative IgE and allergen panel. She was found with flattened inspiratory loop on PFT, saw ENT Dr. Arvizu, suspect vocal cord dysfunction.

## 2023-11-06 ENCOUNTER — APPOINTMENT (OUTPATIENT)
Dept: PULMONOLOGY | Facility: CLINIC | Age: 33
End: 2023-11-06

## 2023-11-08 ENCOUNTER — APPOINTMENT (OUTPATIENT)
Dept: OTOLARYNGOLOGY | Facility: CLINIC | Age: 33
End: 2023-11-08
Payer: COMMERCIAL

## 2023-11-08 VITALS
TEMPERATURE: 98 F | WEIGHT: 170 LBS | HEIGHT: 65 IN | BODY MASS INDEX: 28.32 KG/M2 | SYSTOLIC BLOOD PRESSURE: 141 MMHG | DIASTOLIC BLOOD PRESSURE: 85 MMHG | HEART RATE: 98 BPM

## 2023-11-08 DIAGNOSIS — R06.02 SHORTNESS OF BREATH: ICD-10-CM

## 2023-11-08 DIAGNOSIS — J38.3 OTHER DISEASES OF VOCAL CORDS: ICD-10-CM

## 2023-11-08 PROCEDURE — 99214 OFFICE O/P EST MOD 30 MIN: CPT | Mod: 25

## 2023-11-08 PROCEDURE — 31575 DIAGNOSTIC LARYNGOSCOPY: CPT

## 2023-11-08 RX ORDER — EPINEPHRINE 0.3 MG/.3ML
0.3 INJECTION INTRAMUSCULAR
Qty: 2 | Refills: 0 | Status: ACTIVE | COMMUNITY
Start: 2023-05-26

## 2023-11-08 RX ORDER — ALBUTEROL SULFATE 90 UG/1
108 (90 BASE) INHALANT RESPIRATORY (INHALATION)
Qty: 3 | Refills: 3 | Status: COMPLETED | COMMUNITY
Start: 2022-07-08 | End: 2023-11-08

## 2023-11-08 RX ORDER — METRONIDAZOLE 375 MG/1
375 CAPSULE ORAL
Qty: 14 | Refills: 0 | Status: COMPLETED | COMMUNITY
Start: 2023-10-15

## 2024-05-29 PROBLEM — Z78.9 OTHER SPECIFIED HEALTH STATUS: Chronic | Status: ACTIVE | Noted: 2022-11-17

## 2024-06-27 ENCOUNTER — APPOINTMENT (OUTPATIENT)
Dept: CARDIOLOGY | Facility: CLINIC | Age: 34
End: 2024-06-27
Payer: COMMERCIAL

## 2024-06-27 ENCOUNTER — NON-APPOINTMENT (OUTPATIENT)
Age: 34
End: 2024-06-27

## 2024-06-27 VITALS
SYSTOLIC BLOOD PRESSURE: 110 MMHG | DIASTOLIC BLOOD PRESSURE: 68 MMHG | OXYGEN SATURATION: 98 % | BODY MASS INDEX: 29.66 KG/M2 | WEIGHT: 178 LBS | HEIGHT: 65 IN | HEART RATE: 100 BPM

## 2024-06-27 DIAGNOSIS — R00.2 PALPITATIONS: ICD-10-CM

## 2024-06-27 DIAGNOSIS — R06.02 SHORTNESS OF BREATH: ICD-10-CM

## 2024-06-27 PROCEDURE — 93000 ELECTROCARDIOGRAM COMPLETE: CPT | Mod: 59

## 2024-06-27 PROCEDURE — 99214 OFFICE O/P EST MOD 30 MIN: CPT

## 2024-06-27 PROCEDURE — 93242 EXT ECG>48HR<7D RECORDING: CPT

## 2024-07-03 ENCOUNTER — APPOINTMENT (OUTPATIENT)
Dept: CARDIOLOGY | Facility: CLINIC | Age: 34
End: 2024-07-03

## 2024-10-07 ENCOUNTER — APPOINTMENT (OUTPATIENT)
Dept: CARDIOLOGY | Facility: CLINIC | Age: 34
End: 2024-10-07
Payer: COMMERCIAL

## 2024-10-07 PROCEDURE — 93306 TTE W/DOPPLER COMPLETE: CPT

## 2024-10-08 ENCOUNTER — NON-APPOINTMENT (OUTPATIENT)
Age: 34
End: 2024-10-08

## 2024-11-19 NOTE — ED ADULT TRIAGE NOTE - MEANS OF ARRIVAL
Concerns: blurred vision    Child accompanied by father      Patient's mother would like communication of their results via:    Cell Phone:   Telephone Information:   Mobile [phone]     Okay to leave a message containing results? Yes      Work excuse needed today: No  School/ excuse needed today: No    ambulatory